# Patient Record
Sex: FEMALE | Race: ASIAN | Employment: UNEMPLOYED | URBAN - METROPOLITAN AREA
[De-identification: names, ages, dates, MRNs, and addresses within clinical notes are randomized per-mention and may not be internally consistent; named-entity substitution may affect disease eponyms.]

---

## 2018-04-17 ENCOUNTER — TELEPHONE (OUTPATIENT)
Dept: INTERNAL MEDICINE CLINIC | Facility: CLINIC | Age: 48
End: 2018-04-17

## 2018-04-25 ENCOUNTER — HOSPITAL ENCOUNTER (OUTPATIENT)
Dept: MAMMOGRAPHY | Age: 48
Discharge: HOME | End: 2018-04-25
Attending: OBSTETRICS & GYNECOLOGY
Payer: COMMERCIAL

## 2018-04-25 DIAGNOSIS — Z12.31 SCREENING MAMMOGRAM, ENCOUNTER FOR: ICD-10-CM

## 2018-04-25 PROCEDURE — 77063 BREAST TOMOSYNTHESIS BI: CPT | Performed by: OBSTETRICS & GYNECOLOGY

## 2018-04-25 PROCEDURE — 77067 SCR MAMMO BI INCL CAD: CPT | Performed by: OBSTETRICS & GYNECOLOGY

## 2018-05-04 ENCOUNTER — HOSPITAL ENCOUNTER (OUTPATIENT)
Dept: MAMMOGRAPHY | Facility: HOSPITAL | Age: 48
Discharge: HOME | End: 2018-05-04
Attending: OBSTETRICS & GYNECOLOGY
Payer: COMMERCIAL

## 2018-05-04 DIAGNOSIS — R92.2 INCONCLUSIVE MAMMOGRAM: ICD-10-CM

## 2018-05-04 PROCEDURE — 77061 BREAST TOMOSYNTHESIS UNI: CPT | Performed by: OBSTETRICS & GYNECOLOGY

## 2018-05-04 PROCEDURE — 77065 DX MAMMO INCL CAD UNI: CPT | Performed by: OBSTETRICS & GYNECOLOGY

## 2018-05-04 PROCEDURE — 76642 ULTRASOUND BREAST LIMITED: CPT | Performed by: OBSTETRICS & GYNECOLOGY

## 2018-05-04 NOTE — IMAGING NOTE
Assisted Dr. Evin Graham with Recommendation for an Ultrasound guided breast biopsy. Procedure explained and written information given to Larisa Marroquin. Emotional support provided and all questions answered.   Our breast center schedulers will call pt within 67

## 2018-05-21 ENCOUNTER — HOSPITAL ENCOUNTER (OUTPATIENT)
Dept: MAMMOGRAPHY | Age: 48
Discharge: HOME | End: 2018-05-21
Attending: OBSTETRICS & GYNECOLOGY
Payer: COMMERCIAL

## 2018-05-21 ENCOUNTER — HOSPITAL ENCOUNTER (OUTPATIENT)
Dept: ULTRASOUND IMAGING | Age: 48
Discharge: HOME | End: 2018-05-21
Attending: OBSTETRICS & GYNECOLOGY
Payer: COMMERCIAL

## 2018-05-21 DIAGNOSIS — N63.0 BREAST NODULE: ICD-10-CM

## 2018-05-21 PROCEDURE — 88305 TISSUE EXAM BY PATHOLOGIST: CPT | Performed by: OBSTETRICS & GYNECOLOGY

## 2018-05-21 PROCEDURE — 77065 DX MAMMO INCL CAD UNI: CPT | Performed by: OBSTETRICS & GYNECOLOGY

## 2018-05-21 PROCEDURE — 19083 BX BREAST 1ST LESION US IMAG: CPT | Performed by: OBSTETRICS & GYNECOLOGY

## 2018-05-21 NOTE — IMAGING NOTE
Pt arrived with spouse. Procedure explained throughout and all questions answered. Consent and discharge paperwork signed by Mike Huynh. Left breast positioned. Assisted Dr. Josep Dillard with [address] [facility] guided biopsy.  Pt tolerated well.  Pressure held on biopsy site f

## 2018-05-23 ENCOUNTER — TELEPHONE (OUTPATIENT)
Dept: MAMMOGRAPHY | Facility: HOSPITAL | Age: 48
End: 2018-05-23

## 2018-10-25 ENCOUNTER — CHARTING TRANS (OUTPATIENT)
Dept: OTHER | Age: 48
End: 2018-10-25

## 2018-11-13 ENCOUNTER — HOSPITAL ENCOUNTER (OUTPATIENT)
Dept: MAMMOGRAPHY | Age: 48
Discharge: HOME | End: 2018-11-13
Attending: OBSTETRICS & GYNECOLOGY
Payer: COMMERCIAL

## 2018-11-13 DIAGNOSIS — Z98.890 HISTORY OF BREAST BIOPSY: ICD-10-CM

## 2018-11-13 PROCEDURE — 77061 BREAST TOMOSYNTHESIS UNI: CPT | Performed by: OBSTETRICS & GYNECOLOGY

## 2018-11-13 PROCEDURE — 77065 DX MAMMO INCL CAD UNI: CPT | Performed by: OBSTETRICS & GYNECOLOGY

## 2019-02-09 ENCOUNTER — HOSPITAL ENCOUNTER (OUTPATIENT)
Dept: GENERAL RADIOLOGY | Facility: HOSPITAL | Age: 49
Discharge: HOME | End: 2019-02-09
Attending: INTERNAL MEDICINE
Payer: COMMERCIAL

## 2019-02-09 DIAGNOSIS — M25.551 HIP PAIN, RIGHT: ICD-10-CM

## 2019-02-09 DIAGNOSIS — M79.18 BUTTOCK PAIN: ICD-10-CM

## 2019-02-09 PROCEDURE — 73502 X-RAY EXAM HIP UNI 2-3 VIEWS: CPT | Performed by: INTERNAL MEDICINE

## 2019-02-09 PROCEDURE — 72110 X-RAY EXAM L-2 SPINE 4/>VWS: CPT | Performed by: INTERNAL MEDICINE

## 2019-02-28 PROBLEM — E06.3 HASHIMOTO'S THYROIDITIS: Status: ACTIVE | Noted: 2019-02-28

## 2019-05-10 ENCOUNTER — HOSPITAL ENCOUNTER (OUTPATIENT)
Dept: MAMMOGRAPHY | Age: 49
Discharge: HOME | End: 2019-05-10
Attending: OBSTETRICS & GYNECOLOGY
Payer: COMMERCIAL

## 2019-05-10 ENCOUNTER — HOSPITAL ENCOUNTER (OUTPATIENT)
Dept: ULTRASOUND IMAGING | Age: 49
Discharge: HOME | End: 2019-05-10
Attending: OBSTETRICS & GYNECOLOGY
Payer: COMMERCIAL

## 2019-05-10 DIAGNOSIS — R92.8 ABNORMAL MAMMOGRAM: ICD-10-CM

## 2019-05-10 PROCEDURE — 77066 DX MAMMO INCL CAD BI: CPT | Performed by: OBSTETRICS & GYNECOLOGY

## 2019-05-10 PROCEDURE — 77062 BREAST TOMOSYNTHESIS BI: CPT | Performed by: OBSTETRICS & GYNECOLOGY

## 2019-05-10 PROCEDURE — 76642 ULTRASOUND BREAST LIMITED: CPT | Performed by: OBSTETRICS & GYNECOLOGY

## 2019-06-06 ENCOUNTER — HOSPITAL ENCOUNTER (OUTPATIENT)
Dept: MRI IMAGING | Age: 49
Discharge: HOME | End: 2019-06-06
Attending: INTERNAL MEDICINE
Payer: COMMERCIAL

## 2019-06-06 DIAGNOSIS — M54.31 SCIATICA OF RIGHT SIDE: ICD-10-CM

## 2019-06-06 PROCEDURE — 72148 MRI LUMBAR SPINE W/O DYE: CPT | Performed by: INTERNAL MEDICINE

## 2019-08-12 ENCOUNTER — LAB ENCOUNTER (OUTPATIENT)
Dept: LAB | Age: 49
End: 2019-08-12
Attending: OBSTETRICS & GYNECOLOGY
Payer: COMMERCIAL

## 2019-08-12 DIAGNOSIS — R10.2 PERINEAL NEURALGIA: Primary | ICD-10-CM

## 2019-08-12 LAB
BASOPHILS # BLD AUTO: 0.02 X10(3) UL (ref 0–0.2)
BASOPHILS NFR BLD AUTO: 0.4 %
BILIRUB UR QL STRIP.AUTO: NEGATIVE
CLARITY UR REFRACT.AUTO: CLEAR
COLOR UR AUTO: YELLOW
DEPRECATED RDW RBC AUTO: 47.9 FL (ref 35.1–46.3)
EOSINOPHIL # BLD AUTO: 0.12 X10(3) UL (ref 0–0.7)
EOSINOPHIL NFR BLD AUTO: 2.3 %
ERYTHROCYTE [DISTWIDTH] IN BLOOD BY AUTOMATED COUNT: 14.9 % (ref 11–15)
GLUCOSE UR STRIP.AUTO-MCNC: NEGATIVE MG/DL
HCG SERPL QL: NEGATIVE
HCT VFR BLD AUTO: 38.7 % (ref 35–48)
HGB BLD-MCNC: 11.7 G/DL (ref 12–16)
IMM GRANULOCYTES # BLD AUTO: 0.01 X10(3) UL (ref 0–1)
IMM GRANULOCYTES NFR BLD: 0.2 %
KETONES UR STRIP.AUTO-MCNC: NEGATIVE MG/DL
LEUKOCYTE ESTERASE UR QL STRIP.AUTO: NEGATIVE
LYMPHOCYTES # BLD AUTO: 1.62 X10(3) UL (ref 1–4)
LYMPHOCYTES NFR BLD AUTO: 30.7 %
MCH RBC QN AUTO: 26.7 PG (ref 26–34)
MCHC RBC AUTO-ENTMCNC: 30.2 G/DL (ref 31–37)
MCV RBC AUTO: 88.4 FL (ref 80–100)
MONOCYTES # BLD AUTO: 0.45 X10(3) UL (ref 0.1–1)
MONOCYTES NFR BLD AUTO: 8.5 %
NEUTROPHILS # BLD AUTO: 3.05 X10 (3) UL (ref 1.5–7.7)
NEUTROPHILS # BLD AUTO: 3.05 X10(3) UL (ref 1.5–7.7)
NEUTROPHILS NFR BLD AUTO: 57.9 %
NITRITE UR QL STRIP.AUTO: NEGATIVE
PH UR STRIP.AUTO: 6 [PH] (ref 4.5–8)
PLATELET # BLD AUTO: 293 10(3)UL (ref 150–450)
PROT UR STRIP.AUTO-MCNC: NEGATIVE MG/DL
RBC # BLD AUTO: 4.38 X10(6)UL (ref 3.8–5.3)
RBC UR QL AUTO: NEGATIVE
SP GR UR STRIP.AUTO: 1.01 (ref 1–1.03)
UROBILINOGEN UR STRIP.AUTO-MCNC: <2 MG/DL
VIT D+METAB SERPL-MCNC: 13.5 NG/ML (ref 30–100)
WBC # BLD AUTO: 5.3 X10(3) UL (ref 4–11)

## 2019-08-12 PROCEDURE — 87591 N.GONORRHOEAE DNA AMP PROB: CPT

## 2019-08-12 PROCEDURE — 85025 COMPLETE CBC W/AUTO DIFF WBC: CPT

## 2019-08-12 PROCEDURE — 87491 CHLMYD TRACH DNA AMP PROBE: CPT

## 2019-08-12 PROCEDURE — 82306 VITAMIN D 25 HYDROXY: CPT

## 2019-08-12 PROCEDURE — 81003 URINALYSIS AUTO W/O SCOPE: CPT

## 2019-08-12 PROCEDURE — 84703 CHORIONIC GONADOTROPIN ASSAY: CPT

## 2019-08-13 LAB
C TRACH DNA SPEC QL NAA+PROBE: NEGATIVE
N GONORRHOEA DNA SPEC QL NAA+PROBE: NEGATIVE

## 2019-09-24 ENCOUNTER — APPOINTMENT (OUTPATIENT)
Dept: PHYSICAL THERAPY | Age: 49
End: 2019-09-24
Attending: PHYSICAL MEDICINE & REHABILITATION
Payer: COMMERCIAL

## 2019-09-27 ENCOUNTER — HOSPITAL ENCOUNTER (OUTPATIENT)
Dept: MRI IMAGING | Facility: HOSPITAL | Age: 49
Discharge: HOME | End: 2019-09-27
Attending: PHYSICAL MEDICINE & REHABILITATION
Payer: COMMERCIAL

## 2019-09-27 DIAGNOSIS — R26.9 ABNORMAL GAIT: ICD-10-CM

## 2019-09-27 DIAGNOSIS — S73.191A TEAR OF RIGHT ACETABULAR LABRUM, INITIAL ENCOUNTER: ICD-10-CM

## 2019-09-27 DIAGNOSIS — M25.551 PAIN OF RIGHT HIP JOINT: ICD-10-CM

## 2019-09-27 PROCEDURE — 73721 MRI JNT OF LWR EXTRE W/O DYE: CPT | Performed by: PHYSICAL MEDICINE & REHABILITATION

## 2019-10-01 NOTE — PROGRESS NOTES
Kimberlyn-- she needs to see ortho soon please    sangita-- I am concerned that this may be significant injury to the hip, called avascular necrosis. I would like to have you see orthopedics. Our office will reach out to you.      Dr Grant Boswell

## 2019-10-02 PROBLEM — M87.9 OSTEONECROSIS OF RIGHT HIP (HCC): Status: ACTIVE | Noted: 2019-10-02

## 2019-10-02 NOTE — PROGRESS NOTES
Message sent by Dr. Ezequiel Sim to pt via [address] I also spoke with pt by phone.   Pt has appt with ortho on 10-2-19

## 2019-10-03 ENCOUNTER — APPOINTMENT (OUTPATIENT)
Dept: PHYSICAL THERAPY | Age: 49
End: 2019-10-03
Attending: PHYSICAL MEDICINE & REHABILITATION
Payer: COMMERCIAL

## 2019-10-08 ENCOUNTER — APPOINTMENT (OUTPATIENT)
Dept: PHYSICAL THERAPY | Age: 49
End: 2019-10-08
Attending: PHYSICAL MEDICINE & REHABILITATION
Payer: COMMERCIAL

## 2019-10-10 ENCOUNTER — APPOINTMENT (OUTPATIENT)
Dept: PHYSICAL THERAPY | Age: 49
End: 2019-10-10
Attending: PHYSICAL MEDICINE & REHABILITATION
Payer: COMMERCIAL

## 2019-10-15 ENCOUNTER — APPOINTMENT (OUTPATIENT)
Dept: PHYSICAL THERAPY | Age: 49
End: 2019-10-15
Attending: PHYSICAL MEDICINE & REHABILITATION
Payer: COMMERCIAL

## 2019-10-17 ENCOUNTER — APPOINTMENT (OUTPATIENT)
Dept: PHYSICAL THERAPY | Age: 49
End: 2019-10-17
Attending: PHYSICAL MEDICINE & REHABILITATION
Payer: COMMERCIAL

## 2019-10-22 ENCOUNTER — APPOINTMENT (OUTPATIENT)
Dept: PHYSICAL THERAPY | Age: 49
End: 2019-10-22
Attending: PHYSICAL MEDICINE & REHABILITATION
Payer: COMMERCIAL

## 2019-10-24 ENCOUNTER — APPOINTMENT (OUTPATIENT)
Dept: PHYSICAL THERAPY | Age: 49
End: 2019-10-24
Attending: PHYSICAL MEDICINE & REHABILITATION
Payer: COMMERCIAL

## 2019-11-22 ENCOUNTER — WALK IN (OUTPATIENT)
Dept: URGENT CARE | Age: 49
End: 2019-11-22

## 2019-11-22 DIAGNOSIS — Z23 NEED FOR IMMUNIZATION AGAINST INFLUENZA: Primary | ICD-10-CM

## 2019-11-22 PROCEDURE — 90686 IIV4 VACC NO PRSV 0.5 ML IM: CPT | Performed by: NURSE PRACTITIONER

## 2019-11-22 PROCEDURE — 90471 IMMUNIZATION ADMIN: CPT | Performed by: NURSE PRACTITIONER

## 2020-08-13 ENCOUNTER — HOSPITAL ENCOUNTER (EMERGENCY)
Age: 50
Discharge: HOME | End: 2020-08-13
Attending: EMERGENCY MEDICINE
Payer: COMMERCIAL

## 2020-08-13 VITALS
TEMPERATURE: 98 F | BODY MASS INDEX: 26 KG/M2 | DIASTOLIC BLOOD PRESSURE: 76 MMHG | HEART RATE: 76 BPM | WEIGHT: 142 LBS | RESPIRATION RATE: 16 BRPM | OXYGEN SATURATION: 99 % | SYSTOLIC BLOOD PRESSURE: 118 MMHG

## 2020-08-13 DIAGNOSIS — L03.019 ONYCHIA AND PARONYCHIA OF FINGER: Primary | ICD-10-CM

## 2020-08-13 PROCEDURE — 87077 CULTURE AEROBIC IDENTIFY: CPT | Performed by: PHYSICIAN ASSISTANT

## 2020-08-13 PROCEDURE — 87205 SMEAR GRAM STAIN: CPT | Performed by: PHYSICIAN ASSISTANT

## 2020-08-13 PROCEDURE — 87186 SC STD MICRODIL/AGAR DIL: CPT | Performed by: PHYSICIAN ASSISTANT

## 2020-08-13 PROCEDURE — 99283 EMERGENCY DEPT VISIT LOW MDM: CPT

## 2020-08-13 PROCEDURE — 87070 CULTURE OTHR SPECIMN AEROBIC: CPT | Performed by: PHYSICIAN ASSISTANT

## 2020-08-13 PROCEDURE — 10060 I&D ABSCESS SIMPLE/SINGLE: CPT

## 2020-08-13 PROCEDURE — 10060 I&D ABSCESS SIMPLE/SINGLE: CPT | Performed by: PHYSICIAN ASSISTANT

## 2020-08-13 RX ORDER — AMOXICILLIN AND CLAVULANATE POTASSIUM 875; 125 MG/1; MG/1
1 TABLET, FILM COATED ORAL 2 TIMES DAILY
Qty: 20 TABLET | Refills: 0 | Status: SHIPPED | OUTPATIENT
Start: 2020-08-13 | End: 2020-08-23

## 2020-08-13 NOTE — ED PROVIDER NOTES
Patient Seen in: [facility] Emergency Department In [place]      History   Patient presents with:  Cellulitis    Stated Complaint: infected R 4th digit    HPI    Warren Das is a 50-year-old female who presents today for evaluation of pain to the right fourth d (Temporal)   Resp 16   Wt 64.4 kg   SpO2 99%   BMI 26.39 kg/m²         Physical Exam  Nursing note reviewed. Vital signs reviewed.   General: A&O x 3; well-developed; well-nourished; no acute distress  Chest: Nontender, normal expansion  Cardio: RRR, no mur arise. Additional verbal discharge instructions are given and discussed. Discharge medications are discussed. The patient is in good condition throughout the visit today and remains so upon discharge.   I discuss the plan of care with the patient, who expr

## 2020-08-15 RX ORDER — CIPROFLOXACIN 500 MG/1
500 TABLET, FILM COATED ORAL 2 TIMES DAILY
Qty: 14 TABLET | Refills: 0 | Status: SHIPPED | OUTPATIENT
Start: 2020-08-15 | End: 2020-08-22

## 2020-08-16 NOTE — ED NOTES
Pt was notified of +culture results.  Pt aware, states she has continued swelling but will start the new abx and follow up if needed with her PMD

## 2020-11-02 ENCOUNTER — LAB ENCOUNTER (OUTPATIENT)
Dept: LAB | Age: 50
End: 2020-11-02
Attending: OBSTETRICS & GYNECOLOGY
Payer: COMMERCIAL

## 2020-11-02 DIAGNOSIS — Z12.72 SMEAR, VAGINAL, AS PART OF ROUTINE GYNECOLOGICAL EXAMINATION: ICD-10-CM

## 2020-11-02 DIAGNOSIS — E03.9 MYXEDEMA HEART DISEASE: ICD-10-CM

## 2020-11-02 DIAGNOSIS — D64.9 ANEMIA, UNSPECIFIED: ICD-10-CM

## 2020-11-02 DIAGNOSIS — I51.9 MYXEDEMA HEART DISEASE: ICD-10-CM

## 2020-11-02 DIAGNOSIS — Z01.419 SMEAR, VAGINAL, AS PART OF ROUTINE GYNECOLOGICAL EXAMINATION: ICD-10-CM

## 2020-11-02 DIAGNOSIS — Z00.00 ROUTINE GENERAL MEDICAL EXAMINATION AT A HEALTH CARE FACILITY: Primary | ICD-10-CM

## 2020-11-02 PROCEDURE — 83002 ASSAY OF GONADOTROPIN (LH): CPT

## 2020-11-02 PROCEDURE — 36415 COLL VENOUS BLD VENIPUNCTURE: CPT

## 2020-11-02 PROCEDURE — 83001 ASSAY OF GONADOTROPIN (FSH): CPT

## 2020-11-02 PROCEDURE — 82306 VITAMIN D 25 HYDROXY: CPT

## 2020-11-02 PROCEDURE — 84703 CHORIONIC GONADOTROPIN ASSAY: CPT

## 2020-11-02 PROCEDURE — 85027 COMPLETE CBC AUTOMATED: CPT

## 2020-11-12 ENCOUNTER — HOSPITAL ENCOUNTER (OUTPATIENT)
Dept: MAMMOGRAPHY | Age: 50
Discharge: HOME | End: 2020-11-12
Attending: OBSTETRICS & GYNECOLOGY
Payer: COMMERCIAL

## 2020-11-12 DIAGNOSIS — Z12.31 ENCOUNTER FOR SCREENING MAMMOGRAM FOR MALIGNANT NEOPLASM OF BREAST: ICD-10-CM

## 2020-11-12 PROCEDURE — 77063 BREAST TOMOSYNTHESIS BI: CPT | Performed by: OBSTETRICS & GYNECOLOGY

## 2020-11-12 PROCEDURE — 77067 SCR MAMMO BI INCL CAD: CPT | Performed by: OBSTETRICS & GYNECOLOGY

## 2021-01-12 ENCOUNTER — LAB ENCOUNTER (OUTPATIENT)
Dept: LAB | Age: 51
End: 2021-01-12
Attending: OBSTETRICS & GYNECOLOGY
Payer: COMMERCIAL

## 2021-01-12 ENCOUNTER — LAB ENCOUNTER (OUTPATIENT)
Dept: LAB | Facility: HOSPITAL | Age: 51
End: 2021-01-12
Attending: OBSTETRICS & GYNECOLOGY
Payer: COMMERCIAL

## 2021-01-12 DIAGNOSIS — E55.9 VITAMIN D DEFICIENCY: Primary | ICD-10-CM

## 2021-01-12 DIAGNOSIS — N95.1 SYMPTOMATIC MENOPAUSAL OR FEMALE CLIMACTERIC STATES: ICD-10-CM

## 2021-01-12 DIAGNOSIS — N94.10 PAIN IN FEMALE GENITALIA ON INTERCOURSE: ICD-10-CM

## 2021-01-12 DIAGNOSIS — N94.10 COITUS PAINFUL FOR FEMALE: ICD-10-CM

## 2021-01-12 LAB — ESTRADIOL SERPL-MCNC: 14.7 PG/ML

## 2021-01-12 PROCEDURE — 36415 COLL VENOUS BLD VENIPUNCTURE: CPT

## 2021-01-12 PROCEDURE — 82670 ASSAY OF TOTAL ESTRADIOL: CPT

## 2021-06-17 ENCOUNTER — LAB ENCOUNTER (OUTPATIENT)
Dept: LAB | Age: 51
End: 2021-06-17
Attending: INTERNAL MEDICINE
Payer: COMMERCIAL

## 2021-06-17 DIAGNOSIS — Z00.00 ANNUAL PHYSICAL EXAM: Primary | ICD-10-CM

## 2021-06-17 PROCEDURE — 87147 CULTURE TYPE IMMUNOLOGIC: CPT

## 2021-06-17 PROCEDURE — 36415 COLL VENOUS BLD VENIPUNCTURE: CPT

## 2021-06-17 PROCEDURE — 80053 COMPREHEN METABOLIC PANEL: CPT

## 2021-06-17 PROCEDURE — 83036 HEMOGLOBIN GLYCOSYLATED A1C: CPT

## 2021-06-17 PROCEDURE — 81001 URINALYSIS AUTO W/SCOPE: CPT

## 2021-06-17 PROCEDURE — 80061 LIPID PANEL: CPT

## 2021-06-17 PROCEDURE — 85025 COMPLETE CBC W/AUTO DIFF WBC: CPT

## 2021-06-17 PROCEDURE — 84443 ASSAY THYROID STIM HORMONE: CPT

## 2021-06-17 PROCEDURE — 84439 ASSAY OF FREE THYROXINE: CPT

## 2021-06-17 PROCEDURE — 87086 URINE CULTURE/COLONY COUNT: CPT

## 2021-10-01 ENCOUNTER — LAB ENCOUNTER (OUTPATIENT)
Dept: LAB | Age: 51
End: 2021-10-01
Attending: INTERNAL MEDICINE
Payer: COMMERCIAL

## 2021-10-01 DIAGNOSIS — E78.5 SERUM LIPIDS HIGH: Primary | ICD-10-CM

## 2021-10-01 DIAGNOSIS — R74.8 ELEVATED ALKALINE PHOSPHATASE LEVEL: ICD-10-CM

## 2021-10-01 DIAGNOSIS — E06.3 HASHIMOTO'S THYROIDITIS: ICD-10-CM

## 2021-10-01 DIAGNOSIS — R73.09 HIGH GLUCOSE: ICD-10-CM

## 2021-10-01 PROCEDURE — 84075 ASSAY ALKALINE PHOSPHATASE: CPT

## 2021-10-01 PROCEDURE — 36415 COLL VENOUS BLD VENIPUNCTURE: CPT

## 2021-10-01 PROCEDURE — 82947 ASSAY GLUCOSE BLOOD QUANT: CPT

## 2021-10-01 PROCEDURE — 84443 ASSAY THYROID STIM HORMONE: CPT

## 2021-10-01 PROCEDURE — 83036 HEMOGLOBIN GLYCOSYLATED A1C: CPT

## 2021-10-01 PROCEDURE — 80061 LIPID PANEL: CPT

## 2021-10-04 NOTE — PROGRESS NOTES
Mohsen Belcher, your thyroid labs look good. Please continue your current dose. The remaining labs were not ordered by me- looks like they were from her PCP but arcenio them under my name.  Please forward to PCP    Thanks,     Dr. Cristina Dubon

## 2021-11-12 ENCOUNTER — LAB ENCOUNTER (OUTPATIENT)
Dept: LAB | Age: 51
End: 2021-11-12
Attending: INTERNAL MEDICINE
Payer: COMMERCIAL

## 2021-11-12 DIAGNOSIS — R53.83 FATIGUE: Primary | ICD-10-CM

## 2021-11-12 DIAGNOSIS — R79.89 LOW VITAMIN D LEVEL: ICD-10-CM

## 2021-11-12 PROCEDURE — 82607 VITAMIN B-12: CPT

## 2021-11-12 PROCEDURE — 82746 ASSAY OF FOLIC ACID SERUM: CPT

## 2021-11-12 PROCEDURE — 82306 VITAMIN D 25 HYDROXY: CPT

## 2021-11-12 PROCEDURE — 85025 COMPLETE CBC W/AUTO DIFF WBC: CPT

## 2021-11-12 PROCEDURE — 36415 COLL VENOUS BLD VENIPUNCTURE: CPT

## 2021-11-12 PROCEDURE — 86431 RHEUMATOID FACTOR QUANT: CPT

## 2021-11-24 ENCOUNTER — HOSPITAL ENCOUNTER (EMERGENCY)
Facility: HOSPITAL | Age: 51
Discharge: HOME | End: 2021-11-24
Attending: EMERGENCY MEDICINE
Payer: COMMERCIAL

## 2021-11-24 ENCOUNTER — HOSPITAL ENCOUNTER (OUTPATIENT)
Age: 51
Discharge: ANOTHER HOSPITAL | End: 2021-11-24
Attending: EMERGENCY MEDICINE
Payer: COMMERCIAL

## 2021-11-24 ENCOUNTER — APPOINTMENT (OUTPATIENT)
Dept: GENERAL RADIOLOGY | Facility: HOSPITAL | Age: 51
End: 2021-11-24
Attending: EMERGENCY MEDICINE
Payer: COMMERCIAL

## 2021-11-24 VITALS
HEIGHT: 62 IN | WEIGHT: 140 LBS | RESPIRATION RATE: 18 BRPM | TEMPERATURE: 98 F | SYSTOLIC BLOOD PRESSURE: 117 MMHG | OXYGEN SATURATION: 98 % | BODY MASS INDEX: 25.76 KG/M2 | HEART RATE: 80 BPM | DIASTOLIC BLOOD PRESSURE: 73 MMHG

## 2021-11-24 VITALS
TEMPERATURE: 98 F | WEIGHT: 140 LBS | OXYGEN SATURATION: 100 % | SYSTOLIC BLOOD PRESSURE: 99 MMHG | HEIGHT: 61 IN | HEART RATE: 64 BPM | RESPIRATION RATE: 16 BRPM | DIASTOLIC BLOOD PRESSURE: 63 MMHG | BODY MASS INDEX: 26.43 KG/M2

## 2021-11-24 DIAGNOSIS — R07.9 ACUTE CHEST PAIN: Primary | ICD-10-CM

## 2021-11-24 DIAGNOSIS — R07.9 CHEST PAIN OF UNCERTAIN ETIOLOGY: Primary | ICD-10-CM

## 2021-11-24 PROCEDURE — 93010 ELECTROCARDIOGRAM REPORT: CPT

## 2021-11-24 PROCEDURE — 93005 ELECTROCARDIOGRAM TRACING: CPT

## 2021-11-24 PROCEDURE — 99284 EMERGENCY DEPT VISIT MOD MDM: CPT

## 2021-11-24 PROCEDURE — 99214 OFFICE O/P EST MOD 30 MIN: CPT

## 2021-11-24 PROCEDURE — 71045 X-RAY EXAM CHEST 1 VIEW: CPT | Performed by: EMERGENCY MEDICINE

## 2021-11-24 PROCEDURE — 36415 COLL VENOUS BLD VENIPUNCTURE: CPT

## 2021-11-24 PROCEDURE — 80053 COMPREHEN METABOLIC PANEL: CPT | Performed by: EMERGENCY MEDICINE

## 2021-11-24 PROCEDURE — 84484 ASSAY OF TROPONIN QUANT: CPT | Performed by: EMERGENCY MEDICINE

## 2021-11-24 PROCEDURE — 85025 COMPLETE CBC W/AUTO DIFF WBC: CPT | Performed by: EMERGENCY MEDICINE

## 2021-11-24 RX ORDER — FOLIC ACID 1 MG/1
TABLET ORAL DAILY
COMMUNITY

## 2021-11-24 RX ORDER — ASPIRIN 81 MG/1
324 TABLET, CHEWABLE ORAL ONCE
Status: COMPLETED | OUTPATIENT
Start: 2021-11-24 | End: 2021-11-24

## 2021-11-24 NOTE — ED PROVIDER NOTES
Patient Seen in: [facility] Emergency Department      History   Patient presents with:  Chest Pain Angina    Stated Complaint: from IC, chest pain last night.  normal EKG    Subjective:   HPI    18-year-old female who presents to the emergency depart and negative except as noted above.     Physical Exam     ED Triage Vitals [11/24/21 1344]   /74   Pulse 76   Resp 18   Temp 97.7 °F (36.5 °C)   Temp src Temporal   SpO2 97 %   O2 Device None (Room air)       Current:BP 99/63   Pulse 64   Temp 97.7 °F -----------         ------                     CBC W/ DIFFERENTIAL[[id number]]                              Final result                 Please view results for these tests on the individual orders.    CBC W/ DIFFERENTIAL     EKG    Rate, intervals and axes 111 albumin of 3.2. Troponin was normal.  CBC was normal.  On my personal review of the chest x-ray did not see any pneumothorax or evidence of cardiomegaly. No infiltrative process. Agree with the radiology read.   The patient at this time does not appe

## 2021-11-24 NOTE — ED INITIAL ASSESSMENT (HPI)
PT TO ED FROM [facility] WITH C/O CP, NON RADIATING  LOWER LEFT SIDED CP THAT STARTAED 12:00 THIS AM, + EPIGASTRIC PAIN THAT LASTED APPROX 90 MIN.  DENIES SOB, N/V

## 2021-11-24 NOTE — ED PROVIDER NOTES
Patient Seen in: [facility]      History   Patient presents with:  Chest Pain    Stated Complaint: chest pain. 1day    Subjective:   HPI    Patient is a pleasant 56-year-old female, presenting for evaluation of chest pain.     Patient Caron Ambriz Physical Exam    Vital signs noted. GENERAL: Patient is awake and alert, resting comfortably on the cart, in no apparent distress. HEENT: Head is without evidence of trauma. Extraocular muscles are intact.  Pupils are equal and reactive to light

## 2022-08-11 ENCOUNTER — HOSPITAL ENCOUNTER (OUTPATIENT)
Dept: MAMMOGRAPHY | Age: 52
Discharge: HOME | End: 2022-08-11
Attending: INTERNAL MEDICINE
Payer: COMMERCIAL

## 2022-08-11 DIAGNOSIS — Z12.31 ENCOUNTER FOR SCREENING MAMMOGRAM FOR MALIGNANT NEOPLASM OF BREAST: ICD-10-CM

## 2022-08-11 PROCEDURE — 77063 BREAST TOMOSYNTHESIS BI: CPT | Performed by: INTERNAL MEDICINE

## 2022-08-11 PROCEDURE — 77067 SCR MAMMO BI INCL CAD: CPT | Performed by: INTERNAL MEDICINE

## 2022-08-16 ENCOUNTER — HOSPITAL ENCOUNTER (OUTPATIENT)
Dept: ULTRASOUND IMAGING | Age: 52
Discharge: HOME | End: 2022-08-16
Attending: INTERNAL MEDICINE
Payer: COMMERCIAL

## 2022-08-16 DIAGNOSIS — R74.8 ELEVATED LIVER ENZYMES: ICD-10-CM

## 2022-08-16 PROCEDURE — 76700 US EXAM ABDOM COMPLETE: CPT | Performed by: INTERNAL MEDICINE

## 2022-11-29 PROBLEM — D12.5 BENIGN NEOPLASM OF SIGMOID COLON: Status: ACTIVE | Noted: 2022-11-29

## 2022-11-29 PROBLEM — Z12.11 SPECIAL SCREENING FOR MALIGNANT NEOPLASM OF COLON: Status: ACTIVE | Noted: 2022-11-29

## 2023-11-29 ENCOUNTER — HOSPITAL ENCOUNTER (OUTPATIENT)
Dept: MAMMOGRAPHY | Age: 53
Discharge: HOME | End: 2023-11-29
Attending: INTERNAL MEDICINE
Payer: COMMERCIAL

## 2023-11-29 DIAGNOSIS — Z12.31 ENCOUNTER FOR SCREENING MAMMOGRAM FOR MALIGNANT NEOPLASM OF BREAST: ICD-10-CM

## 2023-11-29 PROCEDURE — 77067 SCR MAMMO BI INCL CAD: CPT | Performed by: INTERNAL MEDICINE

## 2023-11-29 PROCEDURE — 77063 BREAST TOMOSYNTHESIS BI: CPT | Performed by: INTERNAL MEDICINE

## 2024-01-03 ENCOUNTER — EKG ENCOUNTER (OUTPATIENT)
Dept: LAB | Age: 54
End: 2024-01-03
Payer: COMMERCIAL

## 2024-01-03 DIAGNOSIS — Z79.899 MEDICATION MANAGEMENT: ICD-10-CM

## 2024-01-03 PROBLEM — F90.2 ATTENTION DEFICIT HYPERACTIVITY DISORDER (ADHD), COMBINED TYPE: Status: ACTIVE | Noted: 2024-01-03

## 2024-01-03 LAB
ATRIAL RATE: 74 BPM
P AXIS: 58 DEGREES
P-R INTERVAL: 134 MS
Q-T INTERVAL: 374 MS
QRS DURATION: 72 MS
QTC CALCULATION (BEZET): 415 MS
R AXIS: 68 DEGREES
T AXIS: 41 DEGREES
VENTRICULAR RATE: 74 BPM

## 2024-01-03 PROCEDURE — 93005 ELECTROCARDIOGRAM TRACING: CPT

## 2024-01-03 PROCEDURE — 93010 ELECTROCARDIOGRAM REPORT: CPT | Performed by: INTERNAL MEDICINE

## 2024-12-07 ENCOUNTER — HOSPITAL ENCOUNTER (OUTPATIENT)
Dept: MAMMOGRAPHY | Age: 54
Discharge: HOME | End: 2024-12-07
Attending: INTERNAL MEDICINE
Payer: COMMERCIAL

## 2024-12-07 DIAGNOSIS — Z12.31 ENCOUNTER FOR SCREENING MAMMOGRAM FOR MALIGNANT NEOPLASM OF BREAST: ICD-10-CM

## 2024-12-07 PROCEDURE — 77063 BREAST TOMOSYNTHESIS BI: CPT | Performed by: INTERNAL MEDICINE

## 2024-12-07 PROCEDURE — 77067 SCR MAMMO BI INCL CAD: CPT | Performed by: INTERNAL MEDICINE

## 2025-05-22 ENCOUNTER — OFFICE VISIT (OUTPATIENT)
Dept: NEUROLOGY | Facility: CLINIC | Age: 55
End: 2025-05-22
Payer: COMMERCIAL

## 2025-05-22 DIAGNOSIS — R20.9 DISTURBANCE OF SKIN SENSATION: Primary | ICD-10-CM

## 2025-05-22 PROCEDURE — 99204 OFFICE O/P NEW MOD 45 MIN: CPT | Performed by: OTHER

## 2025-05-22 RX ORDER — CHOLECALCIFEROL (VITAMIN D3) 25 MCG
1000 TABLET ORAL DAILY
COMMUNITY

## 2025-05-22 RX ORDER — ROSUVASTATIN CALCIUM 5 MG/1
5 TABLET, COATED ORAL
COMMUNITY

## 2025-05-22 NOTE — PROGRESS NOTES
Pt reports tingling constant tingling in toes and fingers. Present for last two months.          The following individual(s) verbally consented to be recorded using ambient AI listening technology and understand that they can each withdraw their consent to this listening technology at any point by asking the clinician to turn off or pause the recording:    Patient name: Shiela Barksdale

## 2025-05-22 NOTE — PROGRESS NOTES
Neurology H&P    Shiela Barksdale Patient Status:  No patient class for patient encounter    3/21/1970 MRN [id number]   Location [facility], [address] [place] [address] Attending No att. providers found   Hosp Day # 0 PCP Allyson Mcfadden MD     Subjective:  Shiela Barksdale is a(n) 55 year old female.  History of Present Illness  Shiela Barksdale is a 55 year old female who presents with burning sensations in her toes and fingertips. She was referred by her primary care physician for evaluation of burning sensations in her toes and fingertips.    She has been experiencing a burning sensation at the tips of her toes since February or March, initially attributing it to cold weather. The sensation later appeared on her fingertips. The burning is constant but not painful and does not hinder her daily activities. No neck pain radiating to her arms, low back pain radiating to her legs, problems walking, falls, numbness, weakness, or tingling elsewhere.    She has observed recent changes in her nails, which she initially thought might be related to her symptoms. However, there is no family history of neuropathy or similar conditions.     Current Medications:  Current Medications[1]    Problem List:  Problem List[2]    PMHx:  Past Medical History[3]    PSHx:  Past Surgical History[4]    SocHx:  Short Social Hx on File[5]    Family History:  Family History[6]        ROS:  10 point ROS completed and was negative, except for pertinent positive and negatives stated in subjective.    Objective/Physical Exam:    Vital Signs:  Last menstrual period 2020.    Gen: Awake and in no apparent distress  HEENT: moist mucus membranes  Neck: Supple  Cardiovascular: Regular rate and rhythm, no murmur  Pulm: CTAB  GI: non-tender, normal bowel sounds  Skin: normal, dry  Extremities: No clubbing or cyanosis, pitting of the fingernails on the R hand      Neurologic:   Physical Exam  MENTAL STATUS: alert, ox3, normal  attention, language and fund of knowledge.      CRANIAL NERVES II to XII: PERRLA, no ptosis or diplopia, EOM intact, facial sensation intact, strong eye closure, face is symmetric, no dysarthria, tongue midline,  no tongue fasciculations or atrophy, strong shoulder shrug.    MOTOR EXAMINATION: normal tone, no fasciculations, normal strength throughout in UEs and LEs      SENSORY EXAMINATION:  UE: intact to light touch, pinprick intact  LE: intact to light touch, pinprick intact    COORDINATION:  No dysmetria, or intention tremors     REFLEXES: 2+ at biceps, 2+ brachioradialis, 2+ at patella     GAIT: normal stance, normal toe gait and heel gait, tandem well.    Romberg's: negative           Labs:       Imaging:  No CNS imaging to review       Assessment & Plan  Patient is a 51-year-old female with 2 months of burning sensations in the distal fingertips and toes.  Her exam as noted above today.  She has no sensory loss on exam today.  She does have some pitting of her nails which she was worried about.  I doubt this was due to a distinct neurologic issue.  Possibly she does have some peripheral neuropathy and I will order an EMG to assess for this.  She has no radiating pains in the cervical or lumbar areas to suggest a radiculopathy.  I will also order basic labs evaluate for toxic or inflammatory neuropathies.    - Order EMG to assess nerve damage.  - Order blood tests for vitamin B12, B6, rheumatologic disease, and monoclonal protein study.  - Schedule follow-up post-EMG to discuss results.        Moises Matos, DO  Neurology       [1]   Current Outpatient Medications   Medication Sig Dispense Refill    cholecalciferol 25 MCG (1000 UT) Oral Tab Take 1 tablet (1,000 Units total) by mouth daily.      rosuvastatin 5 MG Oral Tab Take 1 tablet (5 mg total) by mouth twice a week.      AMPHETAMINE-DEXTROAMPHET ER 20 MG Oral Capsule SR 24 Hr TAKE 1 CAPSULE BY MOUTH EVERY DAY 30 capsule 0    finasteride 5 MG Oral Tab  Take 1 tablet (5 mg total) by mouth daily. 30 tablet 5    hydrOXYzine Pamoate (VISTARIL) 25 MG Oral Cap Take 1-2 capsules (25-50 mg total) by mouth daily as needed for Anxiety. 120 capsule 0    Ciclopirox 8 % External Solution Apply 1 Application topically nightly. 6 mL 6    LEVOTHYROXINE 75 MCG Oral Tab TAKE 1 TABLET(75 MCG) BY MOUTH BEFORE BREAKFAST 90 tablet 1   [2]   Patient Active Problem List  Diagnosis    Cervicalgia    Backache, unspecified    Migraine, unspecified, without mention of intractable migraine without mention of status migrainosus    Urticaria, unspecified    Diarrhea    Unspecified pruritic disorder    Unspecified hypothyroidism    Anemia, unspecified    Other malaise and fatigue    Menorrhagia with regular cycle    Hashimoto's thyroiditis    Osteonecrosis of right hip (HCC)    Special screening for malignant neoplasm of colon    Benign neoplasm of sigmoid colon    Attention deficit hyperactivity disorder (ADHD), combined type    Disturbance of skin sensation   [3]   Past Medical History:   ALLERGIC RHINITIS    Bleeding nose    Change in hair    Easy bruising    Headache disorder    High cholesterol    Hyperlipidemia    Hypothyroid    HYPOTHYROIDISM    Migraine    Migraines    Pain in joints    Prediabetes    Presence of other cardiac implants and grafts    Right hip repleavement    Seasonal allergies    Sleep apnea    Vertigo    Wears glasses    Weight gain   [4]   Past Surgical History:  Procedure Laterality Date          x 1 ( 12 Years)    Hip replacement surgery Right 2019    Needle biopsy left Left 2018    benign u/s guided    Us breast biopsy 1 site left (cpt=19083) Left 2018    benign    [5]   Social History  Socioeconomic History    Marital status:    Tobacco Use    Smoking status: Never    Smokeless tobacco: Never   Vaping Use    Vaping status: Never Used   Substance and Sexual Activity    Alcohol use: No    Drug use: No   Other Topics Concern    Caffeine  Concern Yes    Stress Concern No    Weight Concern No    Special Diet No    Exercise No    Seat Belt Yes   Social History Narrative    ** Merged History Encounter **          Social Drivers of Health      Received from [facility]    Housing Stability   [6]   Family History  Problem Relation Age of Onset    Diabetes Father     Breast Cancer Paternal Aunt 50        In her 50's.    ADHD Daughter     ADHD Son

## 2025-06-04 ENCOUNTER — PATIENT MESSAGE (OUTPATIENT)
Dept: NEUROLOGY | Facility: CLINIC | Age: 55
End: 2025-06-04

## 2025-06-16 NOTE — TELEPHONE ENCOUNTER
Pt sent [facility] results as follows.    Protein Study, No monoclonal protein detected.  Sed rate,9  Vit B12, 389  Vit B6, 6/1  CORONA, negative.    Provider responded to pt via [employer].      No need to scan as results in media file as attachment to [employer] message.     Removed outstanding orders from system.

## 2025-08-14 ENCOUNTER — PROCEDURE VISIT (OUTPATIENT)
Dept: NEUROLOGY | Facility: CLINIC | Age: 55
End: 2025-08-14

## 2025-08-14 DIAGNOSIS — R20.9 DISTURBANCE OF SKIN SENSATION: Primary | ICD-10-CM

## 2025-08-14 PROCEDURE — 95886 MUSC TEST DONE W/N TEST COMP: CPT | Performed by: OTHER

## 2025-08-14 PROCEDURE — 95912 NRV CNDJ TEST 11-12 STUDIES: CPT | Performed by: OTHER
